# Patient Record
Sex: MALE | Race: BLACK OR AFRICAN AMERICAN | NOT HISPANIC OR LATINO | Employment: UNEMPLOYED | URBAN - METROPOLITAN AREA
[De-identification: names, ages, dates, MRNs, and addresses within clinical notes are randomized per-mention and may not be internally consistent; named-entity substitution may affect disease eponyms.]

---

## 2020-02-10 NOTE — TELEPHONE ENCOUNTER
Patient called to set up care with [facility]  Patient had cervical surgery in [place] on January 3, 2020  He explains that he had disc replacement of C3, C4, C5  Patient states I he stayed in the hospital for three days and then went to a rehab facility and then transitioned to outpatient PT  He is temporarily staying with his children in [place] and his doctors in [place] instructed him to find an orthopedic surgeon to follow him here, as to continue PT  Patient's current insurance [facility] Medicaid through the state of [place]  I advised the patient that we would not be able to see him with out of state Medicaid  I further advised that he should contact member services for guidance

## 2020-06-04 ENCOUNTER — EVALUATION (OUTPATIENT)
Dept: PHYSICAL THERAPY | Facility: REHABILITATION | Age: 65
End: 2020-06-04
Payer: COMMERCIAL

## 2020-06-04 DIAGNOSIS — M54.41 CHRONIC BILATERAL LOW BACK PAIN WITH BILATERAL SCIATICA: ICD-10-CM

## 2020-06-04 DIAGNOSIS — G89.29 CHRONIC BILATERAL LOW BACK PAIN WITH BILATERAL SCIATICA: ICD-10-CM

## 2020-06-04 DIAGNOSIS — M51.36 DDD (DEGENERATIVE DISC DISEASE), LUMBAR: Primary | ICD-10-CM

## 2020-06-04 DIAGNOSIS — M54.42 CHRONIC BILATERAL LOW BACK PAIN WITH BILATERAL SCIATICA: ICD-10-CM

## 2020-06-04 PROCEDURE — 97110 THERAPEUTIC EXERCISES: CPT | Performed by: PHYSICAL THERAPIST

## 2020-06-04 PROCEDURE — 97163 PT EVAL HIGH COMPLEX 45 MIN: CPT | Performed by: PHYSICAL THERAPIST

## 2020-06-08 ENCOUNTER — TRANSCRIBE ORDERS (OUTPATIENT)
Dept: PHYSICAL THERAPY | Facility: REHABILITATION | Age: 65
End: 2020-06-08

## 2020-06-08 DIAGNOSIS — M51.36 DDD (DEGENERATIVE DISC DISEASE), LUMBAR: Primary | ICD-10-CM

## 2020-06-17 ENCOUNTER — OFFICE VISIT (OUTPATIENT)
Dept: PHYSICAL THERAPY | Facility: REHABILITATION | Age: 65
End: 2020-06-17
Payer: COMMERCIAL

## 2020-06-17 DIAGNOSIS — M54.41 CHRONIC BILATERAL LOW BACK PAIN WITH BILATERAL SCIATICA: ICD-10-CM

## 2020-06-17 DIAGNOSIS — G89.29 CHRONIC BILATERAL LOW BACK PAIN WITH BILATERAL SCIATICA: ICD-10-CM

## 2020-06-17 DIAGNOSIS — M54.42 CHRONIC BILATERAL LOW BACK PAIN WITH BILATERAL SCIATICA: ICD-10-CM

## 2020-06-17 DIAGNOSIS — M51.36 DDD (DEGENERATIVE DISC DISEASE), LUMBAR: Primary | ICD-10-CM

## 2020-06-17 PROCEDURE — 97112 NEUROMUSCULAR REEDUCATION: CPT

## 2020-06-17 PROCEDURE — 97530 THERAPEUTIC ACTIVITIES: CPT

## 2020-06-17 PROCEDURE — 97110 THERAPEUTIC EXERCISES: CPT

## 2020-06-19 ENCOUNTER — OFFICE VISIT (OUTPATIENT)
Dept: PHYSICAL THERAPY | Facility: REHABILITATION | Age: 65
End: 2020-06-19
Payer: COMMERCIAL

## 2020-06-19 DIAGNOSIS — M54.41 CHRONIC BILATERAL LOW BACK PAIN WITH BILATERAL SCIATICA: ICD-10-CM

## 2020-06-19 DIAGNOSIS — G89.29 CHRONIC BILATERAL LOW BACK PAIN WITH BILATERAL SCIATICA: ICD-10-CM

## 2020-06-19 DIAGNOSIS — M54.42 CHRONIC BILATERAL LOW BACK PAIN WITH BILATERAL SCIATICA: ICD-10-CM

## 2020-06-19 DIAGNOSIS — M51.36 DDD (DEGENERATIVE DISC DISEASE), LUMBAR: Primary | ICD-10-CM

## 2020-06-19 PROCEDURE — 97530 THERAPEUTIC ACTIVITIES: CPT | Performed by: PHYSICAL THERAPIST

## 2020-06-19 PROCEDURE — 97112 NEUROMUSCULAR REEDUCATION: CPT | Performed by: PHYSICAL THERAPIST

## 2020-06-19 PROCEDURE — 97110 THERAPEUTIC EXERCISES: CPT | Performed by: PHYSICAL THERAPIST

## 2020-06-23 ENCOUNTER — OFFICE VISIT (OUTPATIENT)
Dept: PHYSICAL THERAPY | Facility: REHABILITATION | Age: 65
End: 2020-06-23
Payer: COMMERCIAL

## 2020-06-23 DIAGNOSIS — M54.42 CHRONIC BILATERAL LOW BACK PAIN WITH BILATERAL SCIATICA: ICD-10-CM

## 2020-06-23 DIAGNOSIS — M54.41 CHRONIC BILATERAL LOW BACK PAIN WITH BILATERAL SCIATICA: ICD-10-CM

## 2020-06-23 DIAGNOSIS — G89.29 CHRONIC BILATERAL LOW BACK PAIN WITH BILATERAL SCIATICA: ICD-10-CM

## 2020-06-23 DIAGNOSIS — M51.36 DDD (DEGENERATIVE DISC DISEASE), LUMBAR: Primary | ICD-10-CM

## 2020-06-23 PROCEDURE — 97530 THERAPEUTIC ACTIVITIES: CPT

## 2020-06-23 PROCEDURE — 97110 THERAPEUTIC EXERCISES: CPT

## 2020-06-23 PROCEDURE — 97112 NEUROMUSCULAR REEDUCATION: CPT

## 2020-06-26 ENCOUNTER — OFFICE VISIT (OUTPATIENT)
Dept: PHYSICAL THERAPY | Facility: REHABILITATION | Age: 65
End: 2020-06-26
Payer: COMMERCIAL

## 2020-06-26 DIAGNOSIS — M54.42 CHRONIC BILATERAL LOW BACK PAIN WITH BILATERAL SCIATICA: ICD-10-CM

## 2020-06-26 DIAGNOSIS — M54.41 CHRONIC BILATERAL LOW BACK PAIN WITH BILATERAL SCIATICA: ICD-10-CM

## 2020-06-26 DIAGNOSIS — G89.29 CHRONIC BILATERAL LOW BACK PAIN WITH BILATERAL SCIATICA: ICD-10-CM

## 2020-06-26 DIAGNOSIS — M51.36 DDD (DEGENERATIVE DISC DISEASE), LUMBAR: Primary | ICD-10-CM

## 2020-06-26 PROCEDURE — 97112 NEUROMUSCULAR REEDUCATION: CPT

## 2020-06-26 PROCEDURE — 97110 THERAPEUTIC EXERCISES: CPT

## 2020-06-26 PROCEDURE — 97530 THERAPEUTIC ACTIVITIES: CPT

## 2020-06-30 ENCOUNTER — OFFICE VISIT (OUTPATIENT)
Dept: PHYSICAL THERAPY | Facility: REHABILITATION | Age: 65
End: 2020-06-30
Payer: COMMERCIAL

## 2020-06-30 DIAGNOSIS — M54.42 CHRONIC BILATERAL LOW BACK PAIN WITH BILATERAL SCIATICA: Primary | ICD-10-CM

## 2020-06-30 DIAGNOSIS — M51.36 DDD (DEGENERATIVE DISC DISEASE), LUMBAR: ICD-10-CM

## 2020-06-30 DIAGNOSIS — M54.41 CHRONIC BILATERAL LOW BACK PAIN WITH BILATERAL SCIATICA: Primary | ICD-10-CM

## 2020-06-30 DIAGNOSIS — G89.29 CHRONIC BILATERAL LOW BACK PAIN WITH BILATERAL SCIATICA: Primary | ICD-10-CM

## 2020-06-30 PROCEDURE — 97112 NEUROMUSCULAR REEDUCATION: CPT | Performed by: PHYSICAL THERAPIST

## 2020-06-30 PROCEDURE — 97110 THERAPEUTIC EXERCISES: CPT | Performed by: PHYSICAL THERAPIST

## 2020-06-30 PROCEDURE — 97530 THERAPEUTIC ACTIVITIES: CPT | Performed by: PHYSICAL THERAPIST

## 2020-07-02 ENCOUNTER — TRANSCRIBE ORDERS (OUTPATIENT)
Dept: ADMINISTRATIVE | Facility: HOSPITAL | Age: 65
End: 2020-07-02

## 2020-07-02 ENCOUNTER — OFFICE VISIT (OUTPATIENT)
Dept: PHYSICAL THERAPY | Facility: REHABILITATION | Age: 65
End: 2020-07-02
Payer: COMMERCIAL

## 2020-07-02 DIAGNOSIS — M54.42 CHRONIC BILATERAL LOW BACK PAIN WITH BILATERAL SCIATICA: Primary | ICD-10-CM

## 2020-07-02 DIAGNOSIS — G89.29 CHRONIC BILATERAL LOW BACK PAIN WITH BILATERAL SCIATICA: Primary | ICD-10-CM

## 2020-07-02 DIAGNOSIS — M54.41 CHRONIC BILATERAL LOW BACK PAIN WITH BILATERAL SCIATICA: Primary | ICD-10-CM

## 2020-07-02 DIAGNOSIS — M54.2 CERVICAL PAIN: Primary | ICD-10-CM

## 2020-07-02 DIAGNOSIS — D72.829 LEUKOCYTOSIS, UNSPECIFIED TYPE: Primary | ICD-10-CM

## 2020-07-02 DIAGNOSIS — M51.36 DDD (DEGENERATIVE DISC DISEASE), LUMBAR: ICD-10-CM

## 2020-07-02 PROCEDURE — 97112 NEUROMUSCULAR REEDUCATION: CPT

## 2020-07-02 PROCEDURE — 97530 THERAPEUTIC ACTIVITIES: CPT

## 2020-07-02 PROCEDURE — 97110 THERAPEUTIC EXERCISES: CPT

## 2020-07-02 NOTE — PROGRESS NOTES
Daily Note     Today's date: 2020  Patient name: Genna Chung  : 1955  MRN: [id number]  Referring provider: Regina Joy MD  Dx:   Encounter Diagnosis     ICD-10-CM    1  Chronic bilateral low back pain with bilateral sciatica M54 42     M54 41     G89 29    2  DDD (degenerative disc disease), lumbar M51 36        Start Time: 920  Stop Time: 1025  Total time in clinic (min): 65 minutes    Subjective: Patient reports increased back discomfort at start of session, he reports he has stopped taking medication for his back pain  Objective: See treatment diary below      Assessment: Tolerated treatment well  Patient demonstrated fatigue post treatment, exhibited good technique with therapeutic exercises and would benefit from continued PT Certain TE not performed secondary to patients discomfort level  Trialed sit to stands from chair, significant genu valgus noted, with some improvements noted with TB above knee  Cues required for slow controlled motion, especially upon sitting  Plan: Continue per plan of care  Progress treatment as tolerated         Precautions: fall risk, prior CS fusion C3-C5, will be seeing neurologist      Daily Treatment Diary      Manual 20     nv                     Ther-Ex                bike nv 5'  5' lvl 1   5' lvl 3   5'    5' lvl 5  5' lvl 5     Ball Rolls lumbar spine flx* 10x5" 10x5''     :05x20    Clam shells* bw 3x10 bw 3x12 3x12 bw 3x15 bw 3x15 bw 3x10 PTB 3x10 PTB    SLR abd 2 weeks           LTR       :05x10 ea    SKTC       :[id number]    Neuro-Re-ed           ADIM* 10x5" 10x5'' np             ADIM marches* 2x8 2x10 np   2x10    3x10   3x10  3x10     ADIM heel taps        3x6   3x6  nv     Bridges w PPT* 3x10 3x12 3x15   3x15  20# 3x10 np       Bridge w TB cue      3x12 20# PTB 3x10 PTB    Tandem Balance nv :30x3 ea 2x30" 2x30''   np   dc       FTEO foam nv :30x3 3x30" 3x30'' np dc     FTEC nv :30x3  np   dc Step and reach at mirror  5 rounds 5 rnds 5 rounds high/low nv 5 rnds high/low     FTEC FOAM   3x30" 3x30'' 3x30'' 2x30"     Low Rows w ppt nv               FT foam head turns and nods       3x30'' ea   3x30" ea                  Ther-Act           Leg Press S3 P 8 nv  3x12 95# 3x15 125#  3x15 145# 3x20 165# 3x20 145#    SL leg Press       nv     Goblet squats or STS      nv 2x10 STS OTB  chair    Fwd Step ups nv 4'' 3x10  np 4'' 3x10 4'' 3x10 6" 3x10     Lat step ups nv   4'' 3x10  4'' 3x10 6" 3x10     Forward/lateral over hurdles     3 laps ea   3 laps ea   3 laps ea  3 laps ea                                                                                                                  Modalities                                         *=on hep

## 2020-07-03 ENCOUNTER — APPOINTMENT (OUTPATIENT)
Dept: PHYSICAL THERAPY | Facility: REHABILITATION | Age: 65
End: 2020-07-03
Payer: COMMERCIAL

## 2020-07-07 ENCOUNTER — HOSPITAL ENCOUNTER (OUTPATIENT)
Dept: CT IMAGING | Facility: HOSPITAL | Age: 65
Discharge: HOME | End: 2020-07-07
Payer: COMMERCIAL

## 2020-07-07 ENCOUNTER — OFFICE VISIT (OUTPATIENT)
Dept: PHYSICAL THERAPY | Facility: REHABILITATION | Age: 65
End: 2020-07-07
Payer: COMMERCIAL

## 2020-07-07 DIAGNOSIS — M54.2 CERVICAL PAIN: ICD-10-CM

## 2020-07-07 DIAGNOSIS — G89.29 CHRONIC BILATERAL LOW BACK PAIN WITH BILATERAL SCIATICA: Primary | ICD-10-CM

## 2020-07-07 DIAGNOSIS — M54.42 CHRONIC BILATERAL LOW BACK PAIN WITH BILATERAL SCIATICA: Primary | ICD-10-CM

## 2020-07-07 DIAGNOSIS — M51.36 DDD (DEGENERATIVE DISC DISEASE), LUMBAR: ICD-10-CM

## 2020-07-07 DIAGNOSIS — M54.41 CHRONIC BILATERAL LOW BACK PAIN WITH BILATERAL SCIATICA: Primary | ICD-10-CM

## 2020-07-07 PROCEDURE — 97112 NEUROMUSCULAR REEDUCATION: CPT | Performed by: PHYSICAL THERAPIST

## 2020-07-07 PROCEDURE — 72125 CT NECK SPINE W/O DYE: CPT

## 2020-07-07 PROCEDURE — 97140 MANUAL THERAPY 1/> REGIONS: CPT | Performed by: PHYSICAL THERAPIST

## 2020-07-07 PROCEDURE — 97110 THERAPEUTIC EXERCISES: CPT | Performed by: PHYSICAL THERAPIST

## 2020-07-07 PROCEDURE — 97530 THERAPEUTIC ACTIVITIES: CPT | Performed by: PHYSICAL THERAPIST

## 2020-07-07 NOTE — PROGRESS NOTES
Daily Note     Today's date: 2020  Patient name: Inna Motta  : 1955  MRN: [id number]  Referring provider: Darcie Styles MD  Dx:   Encounter Diagnosis     ICD-10-CM    1  Chronic bilateral low back pain with bilateral sciatica M54 42     M54 41     G89 29    2  DDD (degenerative disc disease), lumbar M51 36        Start Time: 1050  Stop Time: 1200  Total time in clinic (min): 70 minutes    Subjective: Velia Smith reports that his low back is sore today and he is having an increased challenge with ambulation  Notes his back hurts only when standing  Objective: See treatment diary below      Assessment: Tolerated treatment well  Patient demonstrated fatigue post treatment, exhibited good technique with therapeutic exercises and would benefit from continued PT   low back pain reduced after table exercises this session  Significant fatigue with SL leg press noted bilaterally, especially forcing patient to focus upon bottom portion of the press vs only going 75% into the depth of his hip flexion  Will be seeing neurologist 2020  Plan: Continue per plan of care           Precautions: fall risk, prior CS fusion C3-C5, will be seeing neurologist      Daily Treatment Diary      Manual 20   Hamstring/piriformis range of motion   nv       8' total   left long axis distraction        done   Ther-Ex                bike nv 5'  5' lvl 1   5' lvl 3   5'    5' lvl 5   5' lvl 5 5' lvl 5   Ball Rolls lumbar spine flx* 10x5" 10x5''     :05x20    Clam shells* bw 3x10 bw 3x12 3x12 bw 3x15 bw 3x15 bw 3x10 PTB 3x10 PTB 3x10 PTB   SLR abd 2 weeks           LTR       :05x10 ea 10x5"   SKTC       :[id number] 10x10"   Neuro-Re-ed           ADIM* 10x5" 10x5'' np             ADIM marches* 2x8 2x10 np   2x10    3x10   3x10   3x10  3x10   ADIM heel taps        3x6   3x6   nv  np   Bridges w PPT* 3x10 3x12 3x15   3x15  20# 3x10 np       Bridge w TB cue 3x12 20# PTB 3x10 PTB 3x12 20#   Seated sciatic N glides        10x5"   Tandem Balance nv :30x3 ea 2x30" 2x30''   np   dc       FTEO foam nv :30x3 3x30" 3x30'' np dc     FTEC nv :30x3  np   dc     Step and reach at mirror  5 rounds 5 rnds 5 rounds high/low nv 5 rnds high/low  nv   FTEC FOAM   3x30" 3x30'' 3x30'' 2x30"     Low Rows w ppt nv               FT foam head turns and nods       3x30'' ea   3x30" ea       Seated sciatic N glides        10x5" ea              Ther-Act           Leg Press S3 P 8 nv  3x12 95# 3x15 125#  3x15 145# 3x20 165# 3x20 145#    SL leg Press       nv  left 105# 3x12 right 125# 3x10              Goblet squats or STS      nv 2x10 STS OTB  chair 2x10 STS OTB chair   Fwd Step ups nv 4'' 3x10  np 4'' 3x10 4'' 3x10 6" 3x10     Lat step ups nv   4'' 3x10  4'' 3x10 6" 3x10     Forward/lateral over hurdles     3 laps ea   3 laps ea   3 laps ea   3 laps ea  3 laps ea                                                                                                                Modalities                                         *=on hep

## 2020-07-14 ENCOUNTER — OFFICE VISIT (OUTPATIENT)
Dept: PHYSICAL THERAPY | Facility: REHABILITATION | Age: 65
End: 2020-07-14
Payer: COMMERCIAL

## 2020-07-14 DIAGNOSIS — M54.41 CHRONIC BILATERAL LOW BACK PAIN WITH BILATERAL SCIATICA: Primary | ICD-10-CM

## 2020-07-14 DIAGNOSIS — M54.42 CHRONIC BILATERAL LOW BACK PAIN WITH BILATERAL SCIATICA: Primary | ICD-10-CM

## 2020-07-14 DIAGNOSIS — M51.36 DDD (DEGENERATIVE DISC DISEASE), LUMBAR: ICD-10-CM

## 2020-07-14 DIAGNOSIS — G89.29 CHRONIC BILATERAL LOW BACK PAIN WITH BILATERAL SCIATICA: Primary | ICD-10-CM

## 2020-07-14 PROCEDURE — 97530 THERAPEUTIC ACTIVITIES: CPT

## 2020-07-14 PROCEDURE — 97110 THERAPEUTIC EXERCISES: CPT

## 2020-07-14 PROCEDURE — 97112 NEUROMUSCULAR REEDUCATION: CPT

## 2020-07-14 NOTE — PROGRESS NOTES
Daily Note     Today's date: 2020  Patient name: Leandro Fields  : 1955  MRN: [id number]  Referring provider: Maria Elena Hester MD  Dx:   Encounter Diagnosis     ICD-10-CM    1  Chronic bilateral low back pain with bilateral sciatica M54 42     M54 41     G89 29    2  DDD (degenerative disc disease), lumbar M51 36        Start Time: 1117  Stop Time: 1220  Total time in clinic (min): 63 minutes     Subjective: Justin Kuo reports that his back is "okay" at start of session  He reports "good days and bad days it is sporadic " Patient reports "my legs are weak, it's hard to walk, I notice it all the time "       Objective: See treatment diary below      Assessment: Tolerated treatment well  Patient demonstrated fatigue post treatment, exhibited good technique with therapeutic exercises and would benefit from continued PT Posterior LOB noted 1x with sit to stands  Cues required for slow controlled motion on single leg press for increased quad contraction, little carry over  Improvements noted with coordination and control during evette walking  Plan: Continue per plan of care  Progress treatment as tolerated            Precautions: fall risk, prior CS fusion C3-C5, will be seeing neurologist      Daily Treatment Diary      Manual    Hamstring/piriformis range of motion          8' total   left long axis distraction        done   Ther-Ex                bike 5' lvl 5     5' lvl 3   5'    5' lvl 5   5' lvl 5 5' lvl 5   Ball Rolls lumbar spine flx*       :05x20    Clam shells* 3x10 OTB   3x15 bw 3x15 bw 3x10 PTB 3x10 PTB 3x10 PTB   SLR abd 2 weeks           LTR 10x5''      :05x10 ea 10x5"   SKTC 10x10''      :32y01rs 10x10"   Neuro-Re-ed           ADIM*                ADIM marches*      2x10    3x10   3x10   3x10   3x10   ADIM heel taps        3x6   3x6   nv   np   Bridges w PPT*      3x15  20# 3x10 np       Bridge w TB cue 3x15 20# OTB     3x12 20# PTB 3x10 PTB 3x12 20# Seated sciatic N glides        10x5"   Tandem Balance    2x30''   np   dc       FTEO foam    3x30'' np dc     FTEC      dc     Step and reach at mirror    5 rounds high/low nv 5 rnds high/low  nv   FTEC FOAM    3x30'' 3x30'' 2x30"     Low Rows w ppt nv               FT foam head turns and nods       3x30'' ea   3x30" ea       Seated sciatic N glides 10x5'' ea       10x5" ea              Ther-Act           Leg Press S3 P 8 125# 3x15    3x15 125#  3x15 145# 3x20 165# 3x20 145#    SL leg Press  Left 105# 3x15   Right 125# 3x15     nv  left 105# 3x12 right 125# 3x10              Goblet squats or STS 3x10 STS OTB chair      nv 2x10 STS OTB  chair 2x10 STS OTB chair   Fwd Step ups    4'' 3x10 4'' 3x10 6" 3x10     Lat step ups    4'' 3x10  4'' 3x10 6" 3x10     Forward/lateral over hurdles 5 laps ea    3 laps ea   3 laps ea   3 laps ea   3 laps ea   3 laps ea                                                                                                                Modalities                                         *=on hep

## 2020-07-28 ENCOUNTER — OFFICE VISIT (OUTPATIENT)
Dept: PHYSICAL THERAPY | Facility: REHABILITATION | Age: 65
End: 2020-07-28
Payer: COMMERCIAL

## 2020-07-28 DIAGNOSIS — G89.29 CHRONIC BILATERAL LOW BACK PAIN WITH BILATERAL SCIATICA: Primary | ICD-10-CM

## 2020-07-28 DIAGNOSIS — M54.42 CHRONIC BILATERAL LOW BACK PAIN WITH BILATERAL SCIATICA: Primary | ICD-10-CM

## 2020-07-28 DIAGNOSIS — M51.36 DDD (DEGENERATIVE DISC DISEASE), LUMBAR: ICD-10-CM

## 2020-07-28 DIAGNOSIS — M54.41 CHRONIC BILATERAL LOW BACK PAIN WITH BILATERAL SCIATICA: Primary | ICD-10-CM

## 2020-07-28 PROCEDURE — 97530 THERAPEUTIC ACTIVITIES: CPT

## 2020-07-28 PROCEDURE — 97110 THERAPEUTIC EXERCISES: CPT

## 2020-07-28 PROCEDURE — 97112 NEUROMUSCULAR REEDUCATION: CPT

## 2020-07-28 NOTE — PROGRESS NOTES
Daily Note     Today's date: 2020  Patient name: David Raymond  : 1955  MRN: [id number]  Referring provider: Xander Perkins MD  Dx:   Encounter Diagnosis     ICD-10-CM    1  Chronic bilateral low back pain with bilateral sciatica M54 42     M54 41     G89 29    2  DDD (degenerative disc disease), lumbar M51 36        Start Time: 1400  Stop Time: 1455  Total time in clinic (min): 55 minutes    Subjective: Patient reporting "legs are very heavy, balance is not good, harder for me to walk " He reports increased back/LE "pressure" stating "not really pain just a lot of pressure built up "  Patient reports missing his original [place] appointment, however states he saw another MD and is to have an EMG completed, appointment is not yet made  Objective: See treatment diary below      Assessment: Tolerated treatment fair  Patient demonstrated fatigue post treatment  Significant LOB noted during entire session today, assistance from therapist ranging from CG to mod assist to help patient regain balance multiple times  Patient had reports of low back and bilateral LE tightness and "pressure" during session  Plan: Patient to be placed on hold until follow up with MD and further testing  Will continue to follow up with patient as appropriate        Precautions: fall risk, prior CS fusion C3-C5, will be seeing neurologist      Daily Treatment Diary      Manual    Hamstring/piriformis range of motion          8' total   left long axis distraction        done   Ther-Ex                bike 5' lvl 5 5' lvl 5    5' lvl 3   5'    5' lvl 5   5' lvl 5 5' lvl 5   Ball Rolls lumbar spine flx*       :05x20    Clam shells* 3x10 OTB 3x10 OTB  3x15 bw 3x15 bw 3x10 PTB 3x10 PTB 3x10 PTB   SLR abd 2 weeks           LTR 10x5'' 10x5''     :05x10 ea 10x5"   SKTC 10x10'' 10x10''     :85m65ib 10x10"   Neuro-Re-ed           ADIM*                ADIM marches*      2x10    3x10   3x10 3x10   3x10   ADIM heel taps        3x6   3x6   nv   np   Bridges w PPT*      3x15  20# 3x10 np       Bridge w TB cue 3x15 20# OTB 3x15 20# OTB    3x12 20# PTB 3x10 PTB 3x12 20#   Seated sciatic N glides        10x5"   Tandem Balance    2x30''   np   dc       FTEO foam    3x30'' np dc     FTEC      dc     Step and reach at mirror    5 rounds high/low nv 5 rnds high/low  nv   FTEC FOAM    3x30'' 3x30'' 2x30"     Low Rows w ppt nv               FT foam head turns and nods       3x30'' ea   3x30" ea       Seated sciatic N glides 10x5'' ea       10x5" ea              Ther-Act           Leg Press S3 P 8 125# 3x15  135# 3x15  3x15 125#  3x15 145# 3x20 165# 3x20 145#    SL leg Press  Left 105# 3x15   Right 125# 3x15 np    nv  left 105# 3x12 right 125# 3x10              Goblet squats or STS 3x10 STS OTB chair  3x10 STS OTB chair    nv 2x10 STS OTB  chair 2x10 STS OTB chair   Fwd Step ups    4'' 3x10 4'' 3x10 6" 3x10     Lat step ups    4'' 3x10  4'' 3x10 6" 3x10     Forward/lateral over hurdles 5 laps ea 5 laps ea   3 laps ea   3 laps ea   3 laps ea   3 laps ea   3 laps ea                                                                                                                Modalities                                         *=on hep

## 2020-08-19 ENCOUNTER — TRANSCRIBE ORDERS (OUTPATIENT)
Dept: ADMINISTRATIVE | Facility: HOSPITAL | Age: 65
End: 2020-08-19

## 2020-08-19 DIAGNOSIS — R10.11 RUQ PAIN: Primary | ICD-10-CM

## 2020-08-22 ENCOUNTER — HOSPITAL ENCOUNTER (OUTPATIENT)
Dept: ULTRASOUND IMAGING | Facility: HOSPITAL | Age: 65
Discharge: HOME | End: 2020-08-22
Payer: COMMERCIAL

## 2020-08-22 DIAGNOSIS — R10.11 RUQ PAIN: ICD-10-CM

## 2020-08-22 PROCEDURE — 76700 US EXAM ABDOM COMPLETE: CPT

## 2020-08-26 ENCOUNTER — HOSPITAL ENCOUNTER (EMERGENCY)
Facility: HOSPITAL | Age: 65
Discharge: HOME | End: 2020-08-26
Attending: EMERGENCY MEDICINE | Admitting: EMERGENCY MEDICINE
Payer: COMMERCIAL

## 2020-08-26 ENCOUNTER — TELEPHONE (OUTPATIENT)
Dept: UROLOGY | Facility: AMBULATORY SURGERY CENTER | Age: 65
End: 2020-08-26

## 2020-08-26 VITALS
BODY MASS INDEX: 23.7 KG/M2 | RESPIRATION RATE: 18 BRPM | HEART RATE: 104 BPM | TEMPERATURE: 96.7 F | HEIGHT: 69 IN | DIASTOLIC BLOOD PRESSURE: 77 MMHG | SYSTOLIC BLOOD PRESSURE: 178 MMHG | WEIGHT: 160 LBS | OXYGEN SATURATION: 98 %

## 2020-08-26 DIAGNOSIS — N39.0 UTI (URINARY TRACT INFECTION): ICD-10-CM

## 2020-08-26 DIAGNOSIS — R33.9 URINARY RETENTION: Primary | ICD-10-CM

## 2020-08-26 DIAGNOSIS — N40.0 BPH (BENIGN PROSTATIC HYPERPLASIA): ICD-10-CM

## 2020-08-26 LAB
BACTERIA UR QL AUTO: ABNORMAL /HPF
BILIRUB UR QL STRIP: NEGATIVE
CLARITY UR: CLEAR
COLOR UR: YELLOW
GLUCOSE UR STRIP-MCNC: NEGATIVE MG/DL
HGB UR QL STRIP.AUTO: ABNORMAL
KETONES UR STRIP-MCNC: NEGATIVE MG/DL
LEUKOCYTE ESTERASE UR QL STRIP: NEGATIVE
NITRITE UR QL STRIP: NEGATIVE
NON-SQ EPI CELLS URNS QL MICRO: ABNORMAL /HPF
PH UR STRIP.AUTO: 7 [PH]
PROT UR STRIP-MCNC: NEGATIVE MG/DL
RBC #/AREA URNS AUTO: ABNORMAL /HPF
SP GR UR STRIP.AUTO: 1.01 (ref 1–1.03)
UROBILINOGEN UR QL STRIP.AUTO: 0.2 E.U./DL
WBC #/AREA URNS AUTO: ABNORMAL /HPF

## 2020-08-26 PROCEDURE — 99284 EMERGENCY DEPT VISIT MOD MDM: CPT | Performed by: EMERGENCY MEDICINE

## 2020-08-26 PROCEDURE — 81001 URINALYSIS AUTO W/SCOPE: CPT | Performed by: EMERGENCY MEDICINE

## 2020-08-26 PROCEDURE — 81003 URINALYSIS AUTO W/O SCOPE: CPT | Performed by: EMERGENCY MEDICINE

## 2020-08-26 PROCEDURE — 99283 EMERGENCY DEPT VISIT LOW MDM: CPT

## 2020-08-26 RX ORDER — LIDOCAINE HYDROCHLORIDE 20 MG/ML
1 JELLY TOPICAL ONCE
Status: COMPLETED | OUTPATIENT
Start: 2020-08-26 | End: 2020-08-26

## 2020-08-26 RX ORDER — LEVOFLOXACIN 500 MG/1
500 TABLET, FILM COATED ORAL ONCE
Status: COMPLETED | OUTPATIENT
Start: 2020-08-26 | End: 2020-08-26

## 2020-08-26 RX ORDER — LEVOFLOXACIN 500 MG/1
500 TABLET, FILM COATED ORAL DAILY
Qty: 7 TABLET | Refills: 0 | Status: SHIPPED | OUTPATIENT
Start: 2020-08-26 | End: 2020-09-02

## 2020-08-26 RX ORDER — ATROPA BELLADONNA AND OPIUM 16.2; 3 MG/1; MG/1
30 SUPPOSITORY RECTAL EVERY 8 HOURS PRN
Status: DISCONTINUED | OUTPATIENT
Start: 2020-08-26 | End: 2020-08-26 | Stop reason: HOSPADM

## 2020-08-26 RX ORDER — LEVOFLOXACIN 5 MG/ML
750 INJECTION, SOLUTION INTRAVENOUS ONCE
Status: DISCONTINUED | OUTPATIENT
Start: 2020-08-26 | End: 2020-08-26

## 2020-08-26 RX ADMIN — LIDOCAINE HYDROCHLORIDE 1 APPLICATION: 20 JELLY TOPICAL at 06:23

## 2020-08-26 RX ADMIN — LEVOFLOXACIN 500 MG: 500 TABLET, FILM COATED ORAL at 06:24

## 2020-08-26 RX ADMIN — ATROPA BELLADONNA AND OPIUM 1 SUPPOSITORY: 16.2; 3 SUPPOSITORY RECTAL at 06:23

## 2020-08-26 NOTE — DISCHARGE INSTRUCTIONS
I have referred you to a urologist - [facility]     Stop taking the antibiotic that you were prescribed (Bactrim) and begin the new antibiotic tomorrow - you  have received the initial dose here

## 2020-08-26 NOTE — TELEPHONE ENCOUNTER
Reason for appointment/Complaint/Diagnosis : patient seen in emergency room for urinary retention  Patient is having pain in urethra has catheter mtz   Requesting to be seen as soon as possible,  He is in severe pain  Insurance:txtr  History of Cancer? KE                      If yes, what kind?no  Previous urologist?  yes          Records requested/where? Epic  Outside testing/where? no    Location Preference for office visit?  [facility]

## 2020-08-26 NOTE — ED PROVIDER NOTES
History  Chief Complaint   Patient presents with    Difficulty Urinating     Patient reports difficulty urinating and burning with urination for the past week     This is a 60year old male that ambulated to the ED this morning with c/o urinary retention - he reports that he has a hx of an enlarged prostate  Reports that he has had urinary retention for the past week - placed on Bactrim with no improvement    Has dysuria and voiding very small amounts    500+ ml or urine present on bladder scan    Denies fever or chills    Currently is followed by urology at [place] [id number]  however states that he would like to be referred to a new urologist          None       History reviewed  No pertinent past medical history  History reviewed  No pertinent surgical history  History reviewed  No pertinent family history  I have reviewed and agree with the history as documented  E-Cigarette/Vaping    E-Cigarette Use Never User      E-Cigarette/Vaping Substances     Social History     Tobacco Use    Smoking status: Never Smoker    Smokeless tobacco: Never Used   Substance Use Topics    Alcohol use: Never     Frequency: Never    Drug use: Never       Review of Systems   Constitutional: Negative for activity change, appetite change, chills, diaphoresis, fatigue, fever and unexpected weight change  HENT: Negative for congestion, ear discharge, ear pain, mouth sores, sinus pressure, sinus pain, sneezing, sore throat, trouble swallowing and voice change  Eyes: Negative for photophobia, pain, discharge, redness, itching and visual disturbance  Respiratory: Negative for cough, chest tightness and shortness of breath  Cardiovascular: Negative for chest pain, palpitations and leg swelling  Gastrointestinal: Negative for abdominal pain, constipation, nausea and vomiting  Endocrine: Negative for cold intolerance, heat intolerance, polydipsia, polyphagia and polyuria     Genitourinary: Positive for difficulty urinating, dysuria and urgency  Negative for decreased urine volume, frequency and hematuria  Known BPH   Musculoskeletal: Positive for gait problem (ambulates with a cane)  Negative for arthralgias, back pain, joint swelling, myalgias, neck pain and neck stiffness  Skin: Negative for color change and rash  Allergic/Immunologic: Negative for immunocompromised state  Neurological: Negative for dizziness, tremors, seizures, syncope, speech difficulty, weakness, light-headedness, numbness and headaches  Hematological: Does not bruise/bleed easily  Psychiatric/Behavioral: Negative for behavioral problems and suicidal ideas  Physical Exam  Physical Exam  Vitals signs and nursing note reviewed  Constitutional:       General: He is not in acute distress  Appearance: Normal appearance  He is well-developed and normal weight  He is not ill-appearing, toxic-appearing or diaphoretic  HENT:      Head: Normocephalic and atraumatic  Right Ear: Tympanic membrane, ear canal and external ear normal  There is no impacted cerumen  Left Ear: Tympanic membrane, ear canal and external ear normal  There is no impacted cerumen  Nose: Nose normal  No congestion or rhinorrhea  Mouth/Throat:      Mouth: Mucous membranes are moist       Pharynx: Oropharynx is clear  No oropharyngeal exudate or posterior oropharyngeal erythema  Eyes:      General: No scleral icterus  Right eye: No discharge  Left eye: No discharge  Extraocular Movements: Extraocular movements intact  Conjunctiva/sclera: Conjunctivae normal       Pupils: Pupils are equal, round, and reactive to light  Neck:      Musculoskeletal: Normal range of motion and neck supple  No neck rigidity or muscular tenderness  Vascular: No JVD  Trachea: No tracheal deviation  Cardiovascular:      Rate and Rhythm: Normal rate and regular rhythm  Heart sounds: Normal heart sounds  No murmur     Pulmonary: Effort: Pulmonary effort is normal  No respiratory distress  Breath sounds: Normal breath sounds  No wheezing or rales  Chest:      Chest wall: No tenderness  Abdominal:      General: Bowel sounds are normal  There is distension (bladder distended)  Palpations: Abdomen is soft  There is no mass  Tenderness: There is no abdominal tenderness  There is no right CVA tenderness, left CVA tenderness or guarding  Hernia: No hernia is present  Musculoskeletal: Normal range of motion  General: No swelling, tenderness, deformity or signs of injury  Right lower leg: No edema  Left lower leg: No edema  Lymphadenopathy:      Cervical: No cervical adenopathy  Skin:     General: Skin is warm and dry  Capillary Refill: Capillary refill takes less than 2 seconds  Findings: No bruising, erythema or rash  Neurological:      General: No focal deficit present  Mental Status: He is alert and oriented to person, place, and time  Mental status is at baseline  Cranial Nerves: No cranial nerve deficit  Sensory: No sensory deficit  Motor: No weakness or abnormal muscle tone  Coordination: Coordination normal       Gait: Gait normal       Deep Tendon Reflexes: Reflexes normal    Psychiatric:         Mood and Affect: Mood normal          Behavior: Behavior normal          Thought Content:  Thought content normal          Judgment: Judgment normal          Vital Signs  ED Triage Vitals [08/26/20 0601]   Temperature Pulse Respirations BP SpO2   (!) 96 7 °F (35 9 °C) 104 18 -- 98 %      Temp Source Heart Rate Source Patient Position - Orthostatic VS BP Location FiO2 (%)   Tympanic Monitor Lying Left arm --      Pain Score       --           Vitals:    08/26/20 0601   Pulse: 104   Patient Position - Orthostatic VS: Lying         Visual Acuity      ED Medications  Medications   belladonna-opium (B&O SUPPOSITORY) 16 2-30 mg suppository 1 suppository (1 suppository Rectal Given 8/26/20 0623)   lidocaine (URO-JET) 2 % urethral/mucosal gel 1 application (1 application Urethral Given 8/26/20 0623)   levofloxacin (LEVAQUIN) tablet 500 mg (500 mg Oral Given 8/26/20 0729)       Diagnostic Studies  Results Reviewed     Procedure Component Value Units Date/Time    UA w Reflex to Microscopic w Reflex to Culture [[id number]] Collected:  08/26/20 4810    Lab Status:  No result Specimen:  Urine, Other                  No orders to display              Procedures  Procedures         ED Course  ED Course as of Aug 26 0642   Wed Aug 26, 2020   59 This 60year old male with hx of BPH presents to the ED with urinary retention and dysuria for one week - has been seen by PCP and placed on Bactrim with no relief - 500+ mls of urine on bladder scan after voiding less than 10 ml  Ordered B & O suppository - urojet lidocaine - Coude mtz inserted - return of 500+ ml of urine - changed to leg beg - instructions for care given orally and in discharge instructions  [facility] to [facility]  Changed antibiotic to Levaquin - to stop Bactrim  1822 On re eval after catheter insertion, pt admitted to relief of suprapubic discomfort          US AUDIT      Most Recent Value   Initial Alcohol Screen: US AUDIT-C    1  How often do you have a drink containing alcohol?  0 Filed at: 08/26/2020 0602   2  How many drinks containing alcohol do you have on a typical day you are drinking? 0 Filed at: 08/26/2020 0602   3a  Male UNDER 65: How often do you have five or more drinks on one occasion? 0 Filed at: 08/26/2020 0602   Audit-C Score  0 Filed at: 08/26/2020 0602                  MILTON/DAST-10      Most Recent Value   How many times in the past year have you    Used an illegal drug or used a prescription medication for non-medical reasons?   Never Filed at: 08/26/2020 0603                                MDM  Number of Diagnoses or Management Options  Diagnosis management comments: BPH,UTI       Amount and/or Complexity of Data Reviewed  Clinical lab tests: ordered and reviewed  Obtain history from someone other than the patient: yes (RN)    Risk of Complications, Morbidity, and/or Mortality  Presenting problems: moderate  Diagnostic procedures: low  Management options: low    Patient Progress  Patient progress: improved        Disposition  Final diagnoses:   Urinary retention   UTI (urinary tract infection)   BPH (benign prostatic hyperplasia)     Time reflects when diagnosis was documented in both MDM as applicable and the Disposition within this note     Time User Action Codes Description Comment    8/26/2020  6:10 AM Rj Abbasi [R33 9] Urinary retention     8/26/2020  6:11 AM Rj Abbasi [N39 0] UTI (urinary tract infection)     8/26/2020  6:11 AM Rj Abbasi [N40 0] BPH (benign prostatic hyperplasia)       ED Disposition     ED Disposition Condition Date/Time Comment    Discharge Stable Wed Aug 26, 2020  6:10 AM Meg Brown discharge to home/self care              Follow-up Information    None         Patient's Medications   Discharge Prescriptions    LEVOFLOXACIN (LEVAQUIN) 500 MG TABLET    Take 1 tablet (500 mg total) by mouth daily for 7 days       Start Date: 8/26/2020 End Date: 9/2/2020       Order Dose: 500 mg       Quantity: 7 tablet    Refills: 0         PDMP Review     None          ED Provider  Electronically Signed by           Kyra Flores MD  08/26/20 0050

## 2020-08-26 NOTE — TELEPHONE ENCOUNTER
Patient called second time  Reported to be having increased pain   He is reporting to want the catheter removed as well    Please call at [phone]  Thank you

## 2020-08-26 NOTE — TELEPHONE ENCOUNTER
Attempted to call patient at this time no answer left message with office number for call back    I tentatively scheduled patient for mtz removal and void trial klaudia 8/27/2020 8/27/2020 at 9am with nurse  and 2:45pm with AP  Please confirm appt time and date with patient upon call back   If patient unable to make appt time and date please route back to clinical, as patient will need to wait till next week

## 2020-08-26 NOTE — TELEPHONE ENCOUNTER
Patient seen in the ED this morning for urinary retention  Patient had a PVR of 500+ ml   Rodriguez catheter placed at this time      Will route to provider for appropriate appt timeframe

## 2020-08-27 ENCOUNTER — OFFICE VISIT (OUTPATIENT)
Dept: UROLOGY | Facility: CLINIC | Age: 65
End: 2020-08-27
Payer: COMMERCIAL

## 2020-08-27 VITALS — BODY MASS INDEX: 23.78 KG/M2 | HEIGHT: 69 IN

## 2020-08-27 DIAGNOSIS — R33.9 URINARY RETENTION: Primary | ICD-10-CM

## 2020-08-27 LAB — POST-VOID RESIDUAL VOLUME, ML POC: 130 ML

## 2020-08-27 PROCEDURE — 51798 US URINE CAPACITY MEASURE: CPT | Performed by: PHYSICIAN ASSISTANT

## 2020-08-27 PROCEDURE — 99203 OFFICE O/P NEW LOW 30 MIN: CPT | Performed by: PHYSICIAN ASSISTANT

## 2020-08-27 PROCEDURE — 51702 INSERT TEMP BLADDER CATH: CPT

## 2020-08-27 RX ORDER — TAMSULOSIN HYDROCHLORIDE 0.4 MG/1
0.4 CAPSULE ORAL
COMMUNITY

## 2020-08-27 RX ORDER — FINASTERIDE 5 MG/1
5 TABLET, FILM COATED ORAL DAILY
COMMUNITY

## 2020-08-27 NOTE — PROGRESS NOTES
1  Urinary retention    - POCT Measure PVR          Assessment and plan:       1  BPH with Urinary retention  - catheter was removed this morning and patient has a PVR of 130 mL this afternoon  - he is unable to produce a good urinary stream and is still very uncomfortable  2  Prostate cancer screening  - patient will need digital rectal exam at the time of transrectal ultrasound, he is uncomfortable from his urination today  - PSA will need to be checked several weeks after instrumentation as we would like to avoid a falsely elevated result    I have recommended that the patient have the catheter replaced  He will plan to continue his tamsulosin and finasteride as previously prescribed  He will return in the near future for cystoscopic evaluation in transrectal ultrasound for further evaluation of bladder outlet obstruction and discussion of possible procedures  Erika Millan PA-C      Chief Complaint     No chief complaint on file  History of Present Illness     Meliton Falcon is a 60 y o  male patient seen in the emergency department on 08/26/2020 for urinary retention with greater than 500 mL of retained urine  Rodriguez catheter was placed at that time and patient is on Flomax  Patient was treated for urinary tract infection by Dr Thea Fleischer earlier this month  He was also previously treated through [facility] for incomplete bladder emptying and urinary tract infection  An ultrasound in 2017 showed bladder wall thickening and an enlarged prostate measuring 116 cc  At the time of treatment for his recent urinary tract infection, patient was also initiated on tamsulosin and finasteride  He reports no relief of his symptoms with these medications  Although his PVR today is only 130, he needed to use the restroom immediately after having this checked and reports that he is having significant difficulty with urinating  Only a few drops are coming out at a time      Patient is a relatively poor historian  Laboratory     No results found for: CREATININE    No results found for: PSA    Recent Results (from the past 1 hour(s))   POCT Measure PVR    Collection Time: 08/27/20  2:29 PM   Result Value Ref Range    POST-VOID RESIDUAL VOLUME, ML  mL         Review of Systems     Review of Systems   Constitutional: Negative for chills and fever  HENT: Negative  Eyes: Negative  Respiratory: Negative for cough and shortness of breath  Cardiovascular: Negative for chest pain  Gastrointestinal: Negative for constipation, diarrhea, nausea and vomiting  Endocrine: Negative  Genitourinary: Positive for difficulty urinating, frequency and urgency  Negative for dysuria, enuresis, flank pain and hematuria  Musculoskeletal: Negative  Skin: Negative  Allergies     Allergies   Allergen Reactions    Penicillins Itching       Physical Exam     Physical Exam  Vitals signs and nursing note reviewed  Constitutional:       General: He is not in acute distress  Appearance: Normal appearance  He is well-developed  He is not ill-appearing, toxic-appearing or diaphoretic  HENT:      Head: Normocephalic and atraumatic  Right Ear: External ear normal       Left Ear: External ear normal    Eyes:      General: No scleral icterus  Right eye: No discharge  Left eye: No discharge  Cardiovascular:      Rate and Rhythm: Normal rate  Pulmonary:      Effort: Pulmonary effort is normal    Musculoskeletal:      Comments: Ambulates with a cane   Skin:     General: Skin is warm and dry  Neurological:      Mental Status: He is alert and oriented to person, place, and time  Psychiatric:         Mood and Affect: Mood normal          Behavior: Behavior normal          Thought Content:  Thought content normal          Judgment: Judgment normal            Vital Signs     Vitals:    08/27/20 1422   Height: 5' 9" (1 753 m)         Current Medications Current Outpatient Medications:     finasteride (PROSCAR) 5 mg tablet, Take 5 mg by mouth daily, Disp: , Rfl:     levofloxacin (LEVAQUIN) 500 mg tablet, Take 1 tablet (500 mg total) by mouth daily for 7 days, Disp: 7 tablet, Rfl: 0    tamsulosin (FLOMAX) 0 4 mg, Take 0 4 mg by mouth daily with dinner, Disp: , Rfl:       Active Problems     Patient Active Problem List   Diagnosis    Urinary retention    UTI (urinary tract infection)    BPH (benign prostatic hyperplasia)         Past Medical History     Past Medical History:   Diagnosis Date    History of lumbar surgery          Surgical History     History reviewed  No pertinent surgical history  Family History     No family history on file        Social History     Social History       Radiology

## 2020-08-27 NOTE — PROGRESS NOTES
8/27/2020  Brianna Guidry is a 60 y o  male  [id number]          Orders Placed This Encounter   Procedures    POCT Measure PVR    Cystoscopy        Vitals:    08/27/20 1422   Height: 5' 9" (1 753 m)           Procedure:    Bladder catheterization    Date/Time: 8/27/2020 3:36 PM  Performed by: Sofia Cross RN  Authorized by: Doris Apodaca PA-C     Patient location:  Bedside  Consent:     Consent obtained:  Verbal    Consent given by:  Patient  Universal protocol:     Patient identity confirmed:  Verbally with patient  Pre-procedure details:     Procedure purpose:  Therapeutic  Anesthesia (see MAR for exact dosages): Anesthesia method:  None  Procedure details:     Bladder irrigation: no      Catheter insertion:  Indwelling    Approach: natural orifice      Catheter type:  Coude, latex and Mtz    Catheter size:  16 Fr    Number of attempts:  1    Successful placement: yes      Urine characteristics:  Yellow  Post-procedure details:     Patient tolerance of procedure: Tolerated well, no immediate complications  Comments:      Site cleaned with betadine  After successful insertion of mtz catheter balloon was inflated with 10 ml of sterile water  Mtz flushed with 20 ml of sterile water with good return  Mtz attached to drainage leg bag  Patient provided extra leg bags               Sofia Cross RN

## 2020-08-28 ENCOUNTER — HOSPITAL ENCOUNTER (EMERGENCY)
Facility: HOSPITAL | Age: 65
Discharge: HOME | End: 2020-08-28
Attending: EMERGENCY MEDICINE | Admitting: EMERGENCY MEDICINE
Payer: COMMERCIAL

## 2020-08-28 VITALS
OXYGEN SATURATION: 99 % | WEIGHT: 161 LBS | TEMPERATURE: 98.2 F | RESPIRATION RATE: 18 BRPM | BODY MASS INDEX: 23.85 KG/M2 | HEIGHT: 69 IN | HEART RATE: 83 BPM | SYSTOLIC BLOOD PRESSURE: 148 MMHG | DIASTOLIC BLOOD PRESSURE: 86 MMHG

## 2020-08-28 DIAGNOSIS — T83.9XXA PROBLEM WITH FOLEY CATHETER, INITIAL ENCOUNTER (HCC): Primary | ICD-10-CM

## 2020-08-28 DIAGNOSIS — N32.89 BLADDER SPASMS: Primary | ICD-10-CM

## 2020-08-28 PROCEDURE — 99283 EMERGENCY DEPT VISIT LOW MDM: CPT

## 2020-08-28 PROCEDURE — 99282 EMERGENCY DEPT VISIT SF MDM: CPT | Performed by: EMERGENCY MEDICINE

## 2020-08-28 RX ORDER — OXYBUTYNIN CHLORIDE 5 MG/1
5 TABLET ORAL 3 TIMES DAILY
Qty: 30 TABLET | Refills: 1 | Status: SHIPPED | OUTPATIENT
Start: 2020-08-28 | End: 2020-08-28 | Stop reason: SDUPTHER

## 2020-08-28 RX ORDER — OXYBUTYNIN CHLORIDE 5 MG/1
5 TABLET ORAL 3 TIMES DAILY
Qty: 30 TABLET | Refills: 1 | Status: SHIPPED | OUTPATIENT
Start: 2020-08-28

## 2020-08-28 NOTE — ED PROVIDER NOTES
History  Chief Complaint   Patient presents with    Urinary Catheter Problem     pt presents to ed for leakage, states he was at [facility] and got a new mtz put it  wasnt done correctly  no other complaints at this itme     This is a 62-year-old male who ambulates emergency department this morning with a Mtz catheter dysfunction    Patient was here 2 nights ago with urinary retention  We put a coude and him  He followed up with Urology yesterday  They took the catheter out however he continued to have retention so they put another catheter in  Patient states that he has been leaking around the catheter since they put it in  Denies fever chills  Prior to Admission Medications   Prescriptions Last Dose Informant Patient Reported? Taking?   finasteride (PROSCAR) 5 mg tablet  Self Yes No   Sig: Take 5 mg by mouth daily   levofloxacin (LEVAQUIN) 500 mg tablet  Self No No   Sig: Take 1 tablet (500 mg total) by mouth daily for 7 days   tamsulosin (FLOMAX) 0 4 mg  Self Yes No   Sig: Take 0 4 mg by mouth daily with dinner      Facility-Administered Medications: None       Past Medical History:   Diagnosis Date    History of lumbar surgery        History reviewed  No pertinent surgical history  History reviewed  No pertinent family history  I have reviewed and agree with the history as documented  E-Cigarette/Vaping    E-Cigarette Use Never User      E-Cigarette/Vaping Substances     Social History     Tobacco Use    Smoking status: Never Smoker    Smokeless tobacco: Never Used   Substance Use Topics    Alcohol use: Never     Frequency: Never    Drug use: Never       Review of Systems   Constitutional: Negative for activity change, appetite change, chills, diaphoresis, fatigue, fever and unexpected weight change  HENT: Negative for congestion, ear discharge, ear pain, mouth sores, sinus pressure, sinus pain, sneezing, sore throat, trouble swallowing and voice change      Eyes: Negative for photophobia, pain, discharge, redness, itching and visual disturbance  Respiratory: Negative for cough, chest tightness and shortness of breath  Cardiovascular: Negative for chest pain, palpitations and leg swelling  Gastrointestinal: Negative for abdominal pain, constipation, nausea and vomiting  Endocrine: Negative for cold intolerance, heat intolerance, polydipsia, polyphagia and polyuria  Genitourinary: Negative for decreased urine volume, difficulty urinating, dysuria, frequency, hematuria and urgency  Rodriguez catheter dysfunction    Has BPH and urinary retention as a result   Musculoskeletal: Negative for arthralgias, back pain, gait problem, joint swelling, myalgias, neck pain and neck stiffness  Skin: Negative for color change and rash  Allergic/Immunologic: Negative for immunocompromised state  Neurological: Negative for dizziness, tremors, seizures, syncope, speech difficulty, weakness, light-headedness, numbness and headaches  Hematological: Does not bruise/bleed easily  Psychiatric/Behavioral: Negative for behavioral problems and suicidal ideas  Physical Exam  Physical Exam  Vitals signs and nursing note reviewed  Constitutional:       General: He is not in acute distress  Appearance: Normal appearance  He is well-developed and normal weight  He is not ill-appearing, toxic-appearing or diaphoretic  HENT:      Head: Normocephalic and atraumatic  Right Ear: Tympanic membrane, ear canal and external ear normal  There is no impacted cerumen  Left Ear: Tympanic membrane, ear canal and external ear normal  There is no impacted cerumen  Nose: No congestion or rhinorrhea  Mouth/Throat:      Mouth: Mucous membranes are moist       Pharynx: Oropharynx is clear  No oropharyngeal exudate or posterior oropharyngeal erythema  Eyes:      General: No scleral icterus  Right eye: No discharge  Left eye: No discharge        Extraocular Movements: Extraocular movements intact  Conjunctiva/sclera: Conjunctivae normal       Pupils: Pupils are equal, round, and reactive to light  Neck:      Musculoskeletal: Normal range of motion and neck supple  No neck rigidity or muscular tenderness  Vascular: No JVD  Trachea: No tracheal deviation  Cardiovascular:      Rate and Rhythm: Normal rate and regular rhythm  Heart sounds: Normal heart sounds  No murmur  Pulmonary:      Effort: Pulmonary effort is normal  No respiratory distress  Breath sounds: Normal breath sounds  No wheezing or rales  Chest:      Chest wall: No tenderness  Abdominal:      General: Bowel sounds are normal       Palpations: Abdomen is soft  There is no mass  Tenderness: There is no abdominal tenderness  There is no right CVA tenderness, left CVA tenderness or guarding  Hernia: No hernia is present  Genitourinary:     Comments: Rodriguez catheter intact however leaking urine around the Rodriguez  Musculoskeletal: Normal range of motion  General: No swelling, tenderness, deformity or signs of injury  Right lower leg: No edema  Left lower leg: No edema  Lymphadenopathy:      Cervical: No cervical adenopathy  Skin:     General: Skin is warm and dry  Capillary Refill: Capillary refill takes less than 2 seconds  Findings: No bruising, erythema or rash  Neurological:      General: No focal deficit present  Mental Status: He is alert and oriented to person, place, and time  Mental status is at baseline  Cranial Nerves: No cranial nerve deficit  Sensory: No sensory deficit  Motor: No weakness or abnormal muscle tone  Coordination: Coordination normal       Gait: Gait abnormal (Ambulates with a cane)  Deep Tendon Reflexes: Reflexes normal    Psychiatric:         Mood and Affect: Mood normal          Behavior: Behavior normal          Thought Content:  Thought content normal          Judgment: Judgment normal          Vital Signs  ED Triage Vitals [08/28/20 0643]   Temperature Pulse Respirations Blood Pressure SpO2   98 2 °F (36 8 °C) 83 18 148/86 99 %      Temp Source Heart Rate Source Patient Position - Orthostatic VS BP Location FiO2 (%)   Tympanic Monitor -- Right arm --      Pain Score       No Pain           Vitals:    08/28/20 0643   BP: 148/86   Pulse: 83         Visual Acuity      ED Medications  Medications - No data to display    Diagnostic Studies  Results Reviewed     None                 No orders to display              Procedures  Procedures         ED Course     Care transferred to Dr Radha Rand AUDIT      Most Recent Value   Initial Alcohol Screen: US AUDIT-C    1  How often do you have a drink containing alcohol?  0 Filed at: 08/28/2020 0705   2  How many drinks containing alcohol do you have on a typical day you are drinking? 0 Filed at: 08/28/2020 0705   3a  Male UNDER 65: How often do you have five or more drinks on one occasion? 0 Filed at: 08/28/2020 0705   3b  FEMALE Any Age, or MALE 65+: How often do you have 4 or more drinks on one occassion? 0 Filed at: 08/28/2020 0705   Audit-C Score  0 Filed at: 08/28/2020 4161                  MILTON/DAST-10      Most Recent Value   How many times in the past year have you    Used an illegal drug or used a prescription medication for non-medical reasons? Never Filed at: 08/28/2020 0705                                MDM      Disposition  Final diagnoses:   Problem with Rodriguez catheter, initial encounter [facility])     Time reflects when diagnosis was documented in both MDM as applicable and the Disposition within this note     Time User Action Codes Description Comment    8/28/2020  7:24 AM Rolan Steve Add [T83  9XXA] Problem with Rodriguez catheter, initial encounter [facility])       ED Disposition     ED Disposition Condition Date/Time Comment    Eloped  Fri Aug 28, 2020  7:25 AM Pt left ER , WITHOUT telling anyone        Follow-up Information    None         Discharge Medication List as of 8/28/2020  7:29 AM      CONTINUE these medications which have NOT CHANGED    Details   finasteride (PROSCAR) 5 mg tablet Take 5 mg by mouth daily, Historical Med      levofloxacin (LEVAQUIN) 500 mg tablet Take 1 tablet (500 mg total) by mouth daily for 7 days, Starting Wed 8/26/2020, Until Wed 9/2/2020, Normal      tamsulosin (FLOMAX) 0 4 mg Take 0 4 mg by mouth daily with dinner, Historical Med           No discharge procedures on file      PDMP Review     None          ED Provider  Electronically Signed by           Letitia Santiago MD  08/29/20 8764

## 2020-08-28 NOTE — ED NOTES
Removed fluid from mtz cath balloon and found only to have 7 cc's, repositioned cath and reinflated the balloon with the 10 cc that the cath calls for, secured the mtz with a stat lock and help pt change into dry brief  Will continue to monitor for leakage, pt c/o no pain while cath is secured and not being manipulated       Mary Joseph RN  08/28/20 7851

## 2020-08-30 ENCOUNTER — HOSPITAL ENCOUNTER (EMERGENCY)
Facility: HOSPITAL | Age: 65
Discharge: HOME | End: 2020-08-30
Attending: EMERGENCY MEDICINE | Admitting: EMERGENCY MEDICINE
Payer: COMMERCIAL

## 2020-08-30 ENCOUNTER — APPOINTMENT (EMERGENCY)
Dept: CT IMAGING | Facility: HOSPITAL | Age: 65
End: 2020-08-30
Payer: COMMERCIAL

## 2020-08-30 VITALS
RESPIRATION RATE: 17 BRPM | DIASTOLIC BLOOD PRESSURE: 75 MMHG | TEMPERATURE: 98.6 F | BODY MASS INDEX: 23.63 KG/M2 | HEART RATE: 84 BPM | OXYGEN SATURATION: 99 % | WEIGHT: 160 LBS | SYSTOLIC BLOOD PRESSURE: 137 MMHG

## 2020-08-30 DIAGNOSIS — N39.0 UTI (URINARY TRACT INFECTION): Primary | ICD-10-CM

## 2020-08-30 DIAGNOSIS — T83.9XXA PROBLEM WITH URINARY CATHETER (HCC): ICD-10-CM

## 2020-08-30 LAB
ALBUMIN SERPL BCP-MCNC: 4.1 G/DL (ref 3.4–4.8)
ALP SERPL-CCNC: 57 U/L (ref 10–129)
ALT SERPL W P-5'-P-CCNC: 17 U/L (ref 5–63)
ANION GAP SERPL CALCULATED.3IONS-SCNC: 8 MMOL/L (ref 4–13)
APTT PPP: 31 SECONDS (ref 23–31)
AST SERPL W P-5'-P-CCNC: 36 U/L (ref 15–41)
BACTERIA UR QL AUTO: ABNORMAL /HPF
BASOPHILS # BLD AUTO: 0.02 THOUSANDS/ΜL (ref 0–0.1)
BASOPHILS NFR BLD AUTO: 0 % (ref 0–1)
BILIRUB SERPL-MCNC: 1.78 MG/DL (ref 0.3–1.2)
BILIRUB UR QL STRIP: NEGATIVE
BUN SERPL-MCNC: 14 MG/DL (ref 6–20)
CALCIUM SERPL-MCNC: 9.4 MG/DL (ref 8.4–10.2)
CHLORIDE SERPL-SCNC: 102 MMOL/L (ref 96–108)
CLARITY UR: CLEAR
CO2 SERPL-SCNC: 27 MMOL/L (ref 22–33)
COLOR UR: YELLOW
CREAT SERPL-MCNC: 1.19 MG/DL (ref 0.5–1.2)
EOSINOPHIL # BLD AUTO: 0.17 THOUSAND/ΜL (ref 0–0.61)
EOSINOPHIL NFR BLD AUTO: 3 % (ref 0–6)
ERYTHROCYTE [DISTWIDTH] IN BLOOD BY AUTOMATED COUNT: 12 % (ref 11.6–15.1)
GFR SERPL CREATININE-BSD FRML MDRD: 75 ML/MIN/1.73SQ M
GLUCOSE SERPL-MCNC: 89 MG/DL (ref 65–140)
GLUCOSE UR STRIP-MCNC: NEGATIVE MG/DL
HCT VFR BLD AUTO: 37.7 % (ref 36.5–49.3)
HGB BLD-MCNC: 13.3 G/DL (ref 12–17)
HGB UR QL STRIP.AUTO: ABNORMAL
IMM GRANULOCYTES # BLD AUTO: 0.01 THOUSAND/UL (ref 0–0.2)
IMM GRANULOCYTES NFR BLD AUTO: 0 % (ref 0–2)
INR PPP: 1.08 (ref 0.9–1.1)
KETONES UR STRIP-MCNC: NEGATIVE MG/DL
LEUKOCYTE ESTERASE UR QL STRIP: ABNORMAL
LYMPHOCYTES # BLD AUTO: 2.66 THOUSANDS/ΜL (ref 0.6–4.47)
LYMPHOCYTES NFR BLD AUTO: 50 % (ref 14–44)
MCH RBC QN AUTO: 32.3 PG (ref 26.8–34.3)
MCHC RBC AUTO-ENTMCNC: 35.3 G/DL (ref 31.4–37.4)
MCV RBC AUTO: 92 FL (ref 82–98)
MONOCYTES # BLD AUTO: 0.52 THOUSAND/ΜL (ref 0.17–1.22)
MONOCYTES NFR BLD AUTO: 10 % (ref 4–12)
MUCOUS THREADS UR QL AUTO: ABNORMAL
NEUTROPHILS # BLD AUTO: 1.98 THOUSANDS/ΜL (ref 1.85–7.62)
NEUTS SEG NFR BLD AUTO: 37 % (ref 43–75)
NITRITE UR QL STRIP: NEGATIVE
NON-SQ EPI CELLS URNS QL MICRO: ABNORMAL /HPF
PH UR STRIP.AUTO: 5.5 [PH]
PLATELET # BLD AUTO: 198 THOUSANDS/UL (ref 149–390)
PMV BLD AUTO: 10.4 FL (ref 8.9–12.7)
POTASSIUM SERPL-SCNC: 3.7 MMOL/L (ref 3.5–5)
PROT SERPL-MCNC: 6.9 G/DL (ref 6.4–8.3)
PROT UR STRIP-MCNC: ABNORMAL MG/DL
PROTHROMBIN TIME: 11.4 SECONDS (ref 9.5–12.1)
RBC # BLD AUTO: 4.12 MILLION/UL (ref 3.88–5.62)
RBC #/AREA URNS AUTO: ABNORMAL /HPF
SODIUM SERPL-SCNC: 137 MMOL/L (ref 133–145)
SP GR UR STRIP.AUTO: 1.02 (ref 1–1.03)
UROBILINOGEN UR QL STRIP.AUTO: 0.2 E.U./DL
WBC # BLD AUTO: 5.36 THOUSAND/UL (ref 4.31–10.16)
WBC #/AREA URNS AUTO: ABNORMAL /HPF

## 2020-08-30 PROCEDURE — 99284 EMERGENCY DEPT VISIT MOD MDM: CPT | Performed by: EMERGENCY MEDICINE

## 2020-08-30 PROCEDURE — 36415 COLL VENOUS BLD VENIPUNCTURE: CPT | Performed by: EMERGENCY MEDICINE

## 2020-08-30 PROCEDURE — 99284 EMERGENCY DEPT VISIT MOD MDM: CPT

## 2020-08-30 PROCEDURE — 85610 PROTHROMBIN TIME: CPT | Performed by: EMERGENCY MEDICINE

## 2020-08-30 PROCEDURE — 96361 HYDRATE IV INFUSION ADD-ON: CPT

## 2020-08-30 PROCEDURE — 85025 COMPLETE CBC W/AUTO DIFF WBC: CPT | Performed by: EMERGENCY MEDICINE

## 2020-08-30 PROCEDURE — 96374 THER/PROPH/DIAG INJ IV PUSH: CPT

## 2020-08-30 PROCEDURE — 80053 COMPREHEN METABOLIC PANEL: CPT | Performed by: EMERGENCY MEDICINE

## 2020-08-30 PROCEDURE — 81003 URINALYSIS AUTO W/O SCOPE: CPT | Performed by: EMERGENCY MEDICINE

## 2020-08-30 PROCEDURE — G1004 CDSM NDSC: HCPCS

## 2020-08-30 PROCEDURE — 81001 URINALYSIS AUTO W/SCOPE: CPT | Performed by: EMERGENCY MEDICINE

## 2020-08-30 PROCEDURE — 74177 CT ABD & PELVIS W/CONTRAST: CPT

## 2020-08-30 PROCEDURE — 85730 THROMBOPLASTIN TIME PARTIAL: CPT | Performed by: EMERGENCY MEDICINE

## 2020-08-30 RX ORDER — KETOROLAC TROMETHAMINE 30 MG/ML
30 INJECTION, SOLUTION INTRAMUSCULAR; INTRAVENOUS ONCE
Status: COMPLETED | OUTPATIENT
Start: 2020-08-30 | End: 2020-08-30

## 2020-08-30 RX ADMIN — KETOROLAC TROMETHAMINE 30 MG: 30 INJECTION, SOLUTION INTRAMUSCULAR at 10:34

## 2020-08-30 RX ADMIN — SODIUM CHLORIDE 1000 ML: 0.9 INJECTION, SOLUTION INTRAVENOUS at 10:34

## 2020-08-30 RX ADMIN — IOHEXOL 100 ML: 350 INJECTION, SOLUTION INTRAVENOUS at 11:03

## 2020-08-30 NOTE — ED NOTES
Bloody urine noted in urine, catheter irrigated with 500ml sterile water per provider  One blood clot removed about 1/2 in in size  Negative blood/ pink tinged urine after irrigation  Clear return  Provider aware  Pt re-educated on the importance of not tugging at mtz and the importance of not allowing the urine bag to fill  Pt verbalized understanding of mtz care        Maria Ines Mckeon RN  08/30/20 Nicolasa Ventura , RN  08/30/20 1755

## 2020-08-30 NOTE — ED NOTES
Stat lock securing device replaced on mtz and pt provided with new bag   Pt has emptied mtz bag twice in the ed with no leakage noted          Darwin Coyle RN  08/30/20 [id number]

## 2020-08-30 NOTE — ED PROVIDER NOTES
History  Chief Complaint   Patient presents with    Urinary Catheter Problem     pt presents to ed for catheter problem, states i was seen a few days ago but it still l;eaking, no other complaints at this time     This is a 30-year-old male who ambulates emergency department with problems with his Rodriguez catheter    This is the 3rd time I have seen this gentleman in the past week  At the beginning of the week patient came in with urinary retention  He does have a large prostate  A coude was placed and patient was sent home with a Rodriguez catheter and followed up with urology  He followed up with Urology 2 days later and they attempted to take the catheter out however patient was unable to void so was reinserted  He came by [facility] the next morning as the catheter was leaking  At that point we found that there is only 7 mL of water in the balloon so the balloon was inflated the rest of the way and there was no more leaking  Today patient comes in saying that he knows something is wrong  He states prior to having the catheter he knows that his pancreas liver and kidneys were all normal and feels that is something wrong now  He is complaining of lower abdominal pain  He also has pictures of his Rodriguez catheter and penis and blood on a 4 x 4  He states that in the morning he has to wipe blood off of the catheter  Denies fever or chills    On arrival, leg bag is full of andres colored urine - he is on Levaquin          Prior to Admission Medications   Prescriptions Last Dose Informant Patient Reported?  Taking?   finasteride (PROSCAR) 5 mg tablet  Self Yes No   Sig: Take 5 mg by mouth daily   levofloxacin (LEVAQUIN) 500 mg tablet  Self No No   Sig: Take 1 tablet (500 mg total) by mouth daily for 7 days   oxybutynin (DITROPAN) 5 mg tablet   No No   Sig: Take 1 tablet (5 mg total) by mouth 3 (three) times a day   tamsulosin (FLOMAX) 0 4 mg  Self Yes No   Sig: Take 0 4 mg by mouth daily with dinner Facility-Administered Medications: None       Past Medical History:   Diagnosis Date    History of lumbar surgery        History reviewed  No pertinent surgical history  History reviewed  No pertinent family history  I have reviewed and agree with the history as documented  E-Cigarette/Vaping    E-Cigarette Use Never User      E-Cigarette/Vaping Substances     Social History     Tobacco Use    Smoking status: Never Smoker    Smokeless tobacco: Never Used   Substance Use Topics    Alcohol use: Never     Frequency: Never    Drug use: Never       Review of Systems   Constitutional: Negative for activity change, appetite change, chills, diaphoresis, fatigue, fever and unexpected weight change  HENT: Negative for congestion, ear discharge, ear pain, mouth sores, sinus pressure, sinus pain, sneezing, sore throat, trouble swallowing and voice change  Eyes: Negative for photophobia, pain, discharge, redness, itching and visual disturbance  Respiratory: Negative for cough, chest tightness and shortness of breath  Cardiovascular: Negative for chest pain, palpitations and leg swelling  Gastrointestinal: Negative for abdominal pain, constipation, nausea and vomiting  Endocrine: Negative for cold intolerance, heat intolerance, polydipsia, polyphagia and polyuria  Genitourinary: Positive for hematuria  Negative for decreased urine volume, difficulty urinating, dysuria, frequency and urgency  Urinary retention and mtz cath disorder   Musculoskeletal: Negative for arthralgias, back pain, gait problem, joint swelling, myalgias, neck pain and neck stiffness  Skin: Negative for color change and rash  Allergic/Immunologic: Negative for immunocompromised state  Neurological: Negative for dizziness, tremors, seizures, syncope, speech difficulty, weakness, light-headedness, numbness and headaches  Hematological: Does not bruise/bleed easily     Psychiatric/Behavioral: Negative for behavioral problems and suicidal ideas  Physical Exam  Physical Exam  Vitals signs and nursing note reviewed  Constitutional:       General: He is not in acute distress  Appearance: Normal appearance  He is well-developed and normal weight  He is not ill-appearing, toxic-appearing or diaphoretic  HENT:      Head: Normocephalic and atraumatic  Right Ear: Tympanic membrane, ear canal and external ear normal  There is no impacted cerumen  Left Ear: Tympanic membrane, ear canal and external ear normal  There is no impacted cerumen  Nose: No congestion or rhinorrhea  Mouth/Throat:      Mouth: Mucous membranes are moist       Pharynx: Oropharynx is clear  No oropharyngeal exudate or posterior oropharyngeal erythema  Eyes:      General: No scleral icterus  Right eye: No discharge  Left eye: No discharge  Extraocular Movements: Extraocular movements intact  Conjunctiva/sclera: Conjunctivae normal       Pupils: Pupils are equal, round, and reactive to light  Neck:      Musculoskeletal: Normal range of motion and neck supple  No neck rigidity or muscular tenderness  Vascular: No JVD  Trachea: No tracheal deviation  Cardiovascular:      Rate and Rhythm: Normal rate and regular rhythm  Heart sounds: Normal heart sounds  No murmur  Pulmonary:      Effort: Pulmonary effort is normal  No respiratory distress  Breath sounds: Normal breath sounds  No wheezing or rales  Chest:      Chest wall: No tenderness  Abdominal:      General: Bowel sounds are normal       Palpations: Abdomen is soft  There is no mass  Tenderness: There is no abdominal tenderness  There is no right CVA tenderness, left CVA tenderness or guarding  Hernia: No hernia is present  Genitourinary:     Comments: Rodriguez cath in place = leg bag intact with andres colored urine - no visible blood - no leakage of urine around cath  Musculoskeletal: Normal range of motion  General: No swelling, tenderness, deformity or signs of injury  Right lower leg: No edema  Left lower leg: No edema  Lymphadenopathy:      Cervical: No cervical adenopathy  Skin:     General: Skin is warm and dry  Capillary Refill: Capillary refill takes less than 2 seconds  Findings: No bruising, erythema or rash  Neurological:      General: No focal deficit present  Mental Status: He is alert and oriented to person, place, and time  Mental status is at baseline  Cranial Nerves: No cranial nerve deficit  Sensory: No sensory deficit  Motor: No weakness or abnormal muscle tone  Coordination: Coordination normal       Gait: Gait normal       Deep Tendon Reflexes: Reflexes normal    Psychiatric:         Mood and Affect: Mood normal          Behavior: Behavior normal          Thought Content:  Thought content normal          Judgment: Judgment normal          Vital Signs  ED Triage Vitals [08/30/20 1004]   Temperature Pulse Respirations Blood Pressure SpO2   98 6 °F (37 °C) 78 18 134/77 98 %      Temp Source Heart Rate Source Patient Position - Orthostatic VS BP Location FiO2 (%)   Tympanic Monitor -- Right arm --      Pain Score       5           Vitals:    08/30/20 1004 08/30/20 1100 08/30/20 1201   BP: 134/77 132/71 137/75   Pulse: 78 80 84         Visual Acuity      ED Medications  Medications   ketorolac (TORADOL) injection 30 mg (30 mg Intravenous Given 8/30/20 1034)   sodium chloride 0 9 % bolus 1,000 mL (0 mL Intravenous Stopped 8/30/20 1134)   iohexol (OMNIPAQUE) 350 MG/ML injection (MULTI-DOSE) 100 mL (100 mL Intravenous Given 8/30/20 1103)       Diagnostic Studies  Results Reviewed     Procedure Component Value Units Date/Time    Urine Microscopic [[id number]]  (Abnormal) Collected:  08/30/20 1032    Lab Status:  Final result Specimen:  Urine, Suprapubic catheter Updated:  08/30/20 1114     RBC, UA Innumerable /hpf      WBC, UA 0-5 /hpf      Epithelial Cells Occasional /hpf      Bacteria, UA None Seen /hpf      MUCUS THREADS Occasional    Comprehensive metabolic panel [[id number]]  (Abnormal) Collected:  08/30/20 1031    Lab Status:  Final result Specimen:  Blood from Arm, Right Updated:  08/30/20 1055     Sodium 137 mmol/L      Potassium 3 7 mmol/L      Chloride 102 mmol/L      CO2 27 mmol/L      ANION GAP 8 mmol/L      BUN 14 mg/dL      Creatinine 1 19 mg/dL      Glucose 89 mg/dL      Calcium 9 4 mg/dL      AST 36 U/L      ALT 17 U/L      Alkaline Phosphatase 57 0 U/L      Total Protein 6 9 g/dL      Albumin 4 1 g/dL      Total Bilirubin 1 78 mg/dL      eGFR 75 ml/min/1 73sq m     Narrative:       [place] guidelines for Chronic Kidney Disease (CKD):     Stage 1 with normal or high GFR (GFR > 90 mL/min/1 73 square meters)    Stage 2 Mild CKD (GFR = 60-89 mL/min/1 73 square meters)    Stage 3A Moderate CKD (GFR = 45-59 mL/min/1 73 square meters)    Stage 3B Moderate CKD (GFR = 30-44 mL/min/1 73 square meters)    Stage 4 Severe CKD (GFR = 15-29 mL/min/1 73 square meters)    Stage 5 End Stage CKD (GFR <15 mL/min/1 73 square meters)  Note: GFR calculation is accurate only with a steady state creatinine    Protime-INR [[id number]]  (Normal) Collected:  08/30/20 1031    Lab Status:  Final result Specimen:  Blood from Arm, Right Updated:  08/30/20 1053     Protime 11 4 seconds      INR 1 08    Narrative:       INR Reference Ranges:  No Anticoagulant, Normal:           0 9-1 1  Standard Dose, Oral Anticoagulant:  2 0-3 0  High Dose, Oral Anticoagulant:      2 5-3 5    APTT [[id number]]  (Normal) Collected:  08/30/20 1031    Lab Status:  Final result Specimen:  Blood from Arm, Right Updated:  08/30/20 1053     PTT 31 seconds     UA w Reflex to Microscopic w Reflex to Culture [[id number]]  (Abnormal) Collected:  08/30/20 1032    Lab Status:  Final result Specimen:  Urine, Suprapubic catheter Updated:  08/30/20 1048     Color, UA Yellow     Clarity, UA Clear     Specific Gravity, UA 1 025     pH, UA 5 5     Leukocytes, UA 1+     Nitrite, UA Negative     Protein, UA 2+ mg/dl      Glucose, UA Negative mg/dl      Ketones, UA Negative mg/dl      Urobilinogen, UA 0 2 E U /dl      Bilirubin, UA Negative     Blood, UA 3+    CBC and differential [[id number]]  (Abnormal) Collected:  08/30/20 1031    Lab Status:  Final result Specimen:  Blood from Arm, Right Updated:  08/30/20 1039     WBC 5 36 Thousand/uL      RBC 4 12 Million/uL      Hemoglobin 13 3 g/dL      Hematocrit 37 7 %      MCV 92 fL      MCH 32 3 pg      MCHC 35 3 g/dL      RDW 12 0 %      MPV 10 4 fL      Platelets 615 Thousands/uL      Neutrophils Relative 37 %      Immat GRANS % 0 %      Lymphocytes Relative 50 %      Monocytes Relative 10 %      Eosinophils Relative 3 %      Basophils Relative 0 %      Neutrophils Absolute 1 98 Thousands/µL      Immature Grans Absolute 0 01 Thousand/uL      Lymphocytes Absolute 2 66 Thousands/µL      Monocytes Absolute 0 52 Thousand/µL      Eosinophils Absolute 0 17 Thousand/µL      Basophils Absolute 0 02 Thousands/µL                  CT abdomen pelvis with contrast   Final Result by Maryanne Lynch MD (08/30 1120)      1  Unremarkable kidneys  No CT findings for pyelonephritis  Mtz catheter in position  Questionable diffuse bladder wall thickening though evaluation is limited due to bladder underdistention  2   Marked prostatomegaly  Correlate with PSA levels and urologic evaluation as warranted              Workstation performed: [id number]                    Procedures  Procedures         ED Course  ED Course as of Aug 30 1406   Sun Aug 30, 2020   1351 This is a 60year old male that has had a mtz catheter for the  past week due to urinary retention secondary to an enlarged prostate - he was seen by urology earlier in the week and failed an attempt to remove the cath    He arrives today - he is becoming fixated on the mtz - he has pictures of the cath and his penis on his cell phone that he insists on showing providers insisting there is something wrong  No acute findings on CT except for thickened bladder wall and enlarged prostate    UA shows UTI - will continue Levaquin  No leukocytosis - 5 36  Hemoglobin 13 3    Discharged to follow up with urology - RN spent a lot of time with pt reinstructing him on proper care of the catheter          US AUDIT      Most Recent Value   Initial Alcohol Screen: US AUDIT-C    1  How often do you have a drink containing alcohol?  0 Filed at: 08/30/2020 1004   2  How many drinks containing alcohol do you have on a typical day you are drinking? 0 Filed at: 08/30/2020 1004   3a  Male UNDER 65: How often do you have five or more drinks on one occasion? 0 Filed at: 08/30/2020 1004   3b  FEMALE Any Age, or MALE 65+: How often do you have 4 or more drinks on one occassion? 0 Filed at: 08/30/2020 1004   Audit-C Score  0 Filed at: 08/30/2020 1004                  MILTON/DAST-10      Most Recent Value   How many times in the past year have you    Used an illegal drug or used a prescription medication for non-medical reasons?   Never Filed at: 08/30/2020 1004                                MDM  Number of Diagnoses or Management Options  Problem with urinary catheter ([facility] [address] ): established and worsening  UTI (urinary tract infection): established and worsening  Diagnosis management comments: UTI, urinary retention, pyelo, mtz dysfunction       Amount and/or Complexity of Data Reviewed  Clinical lab tests: ordered and reviewed  Tests in the radiology section of CPT®: ordered and reviewed  Review and summarize past medical records: yes  Independent visualization of images, tracings, or specimens: yes    Risk of Complications, Morbidity, and/or Mortality  Presenting problems: moderate  Diagnostic procedures: low  Management options: low    Patient Progress  Patient progress: stable        Disposition  Final diagnoses:   UTI (urinary tract infection) Problem with urinary catheter [facility])     Time reflects when diagnosis was documented in both MDM as applicable and the Disposition within this note     Time User Action Codes Description Comment    8/30/2020 12:11 PM Gayatri Dilling Add [N39 0] UTI (urinary tract infection)     8/30/2020 12:11 PM Gayatri Dilling Add [T83  9XXA] Problem with urinary catheter [facility])       ED Disposition     ED Disposition Condition Date/Time Comment    Discharge Stable Sun Aug 30, 2020 12:10 PM Jerone Pallas discharge to home/self care  Follow-up Information     Follow up With Specialties Details Why Contact Info    Cortes Cool MD Family Medicine In 1 day  [address]   [phone]            Discharge Medication List as of 8/30/2020 12:12 PM      CONTINUE these medications which have NOT CHANGED    Details   finasteride (PROSCAR) 5 mg tablet Take 5 mg by mouth daily, Historical Med      levofloxacin (LEVAQUIN) 500 mg tablet Take 1 tablet (500 mg total) by mouth daily for 7 days, Starting Wed 8/26/2020, Until Wed 9/2/2020, Normal      oxybutynin (DITROPAN) 5 mg tablet Take 1 tablet (5 mg total) by mouth 3 (three) times a day, Starting Fri 8/28/2020, Normal      tamsulosin (FLOMAX) 0 4 mg Take 0 4 mg by mouth daily with dinner, Historical Med           No discharge procedures on file      PDMP Review     None          ED Provider  Electronically Signed by           Karma Estrada MD  08/30/20 5987

## 2020-09-04 ENCOUNTER — HOSPITAL ENCOUNTER (EMERGENCY)
Facility: HOSPITAL | Age: 65
Discharge: HOME | End: 2020-09-04
Attending: EMERGENCY MEDICINE | Admitting: EMERGENCY MEDICINE
Payer: COMMERCIAL

## 2020-09-04 VITALS
DIASTOLIC BLOOD PRESSURE: 71 MMHG | OXYGEN SATURATION: 100 % | RESPIRATION RATE: 18 BRPM | SYSTOLIC BLOOD PRESSURE: 140 MMHG | HEART RATE: 80 BPM

## 2020-09-04 DIAGNOSIS — T83.091A OBSTRUCTION OF FOLEY CATHETER, INITIAL ENCOUNTER (HCC): Primary | ICD-10-CM

## 2020-09-04 PROCEDURE — 99284 EMERGENCY DEPT VISIT MOD MDM: CPT | Performed by: EMERGENCY MEDICINE

## 2020-09-04 PROCEDURE — 99283 EMERGENCY DEPT VISIT LOW MDM: CPT

## 2020-09-04 RX ORDER — BACITRACIN, NEOMYCIN, POLYMYXIN B 400; 3.5; 5 [USP'U]/G; MG/G; [USP'U]/G
1 OINTMENT TOPICAL ONCE
Status: COMPLETED | OUTPATIENT
Start: 2020-09-04 | End: 2020-09-04

## 2020-09-04 RX ORDER — LIDOCAINE HYDROCHLORIDE 20 MG/ML
1 JELLY TOPICAL ONCE
Status: COMPLETED | OUTPATIENT
Start: 2020-09-04 | End: 2020-09-04

## 2020-09-04 RX ADMIN — BACITRACIN, NEOMYCIN, POLYMYXIN B 1 SMALL APPLICATION: 400; 3.5; 5 OINTMENT TOPICAL at 11:20

## 2020-09-04 RX ADMIN — LIDOCAINE HYDROCHLORIDE 1 APPLICATION: 20 JELLY TOPICAL at 10:05

## 2020-09-04 NOTE — ED NOTES
D/c reviewed with pt  Pt verbalized understanding and has no further questions at this time        Rhianna Mckinney RN  09/04/20 4379

## 2020-09-04 NOTE — ED PROVIDER NOTES
History  Chief Complaint   Patient presents with    Urinary Catheter Problem     pt reports leaking around catheter that was inserted last week     This 60 year male with a history of urinary retention and enlarged prostate, with indwelling Rodriguez catheter, who presents for evaluation of this functioning catheter  He states that he has had urine leaking around catheter and only some of it seems to be going in the back itself  He is postop follow-up with urology in 4 days to have it evaluated possible removal he states, as he has been taking a new prostate medicine  He denies any fevers, chills, vomiting, diarrhea, abdominal pain  History provided by:  Patient   used: No    Urinary Catheter Problem   Location:  Penis  Severity:  Mild  Onset quality:  Gradual  Timing:  Intermittent  Progression:  Waxing and waning  Chronicity:  New      Prior to Admission Medications   Prescriptions Last Dose Informant Patient Reported? Taking?   finasteride (PROSCAR) 5 mg tablet  Self Yes No   Sig: Take 5 mg by mouth daily   oxybutynin (DITROPAN) 5 mg tablet   No No   Sig: Take 1 tablet (5 mg total) by mouth 3 (three) times a day   tamsulosin (FLOMAX) 0 4 mg  Self Yes No   Sig: Take 0 4 mg by mouth daily with dinner      Facility-Administered Medications: None       Past Medical History:   Diagnosis Date    History of lumbar surgery     Prostate hypertrophy        History reviewed  No pertinent surgical history  History reviewed  No pertinent family history  I have reviewed and agree with the history as documented  E-Cigarette/Vaping    E-Cigarette Use Never User      E-Cigarette/Vaping Substances     Social History     Tobacco Use    Smoking status: Never Smoker    Smokeless tobacco: Never Used   Substance Use Topics    Alcohol use: Never     Frequency: Never    Drug use: Never       Review of Systems   Genitourinary: Positive for difficulty urinating     All other systems reviewed and are negative  Physical Exam  Physical Exam  Vitals signs and nursing note reviewed  Constitutional:       Appearance: Normal appearance  He is normal weight  HENT:      Head: Normocephalic and atraumatic  Mouth/Throat:      Mouth: Mucous membranes are moist       Pharynx: Oropharynx is clear  Eyes:      Conjunctiva/sclera: Conjunctivae normal    Cardiovascular:      Rate and Rhythm: Normal rate and regular rhythm  Pulses: Normal pulses  Heart sounds: Normal heart sounds  Pulmonary:      Effort: Pulmonary effort is normal    Abdominal:      General: Abdomen is flat  Palpations: Abdomen is soft  Genitourinary:     Comments: Catheter in place, small leaking urine around bag, brownish urine in bag, no suprapubic tenderness  Musculoskeletal: Normal range of motion  Skin:     General: Skin is warm and dry  Capillary Refill: Capillary refill takes less than 2 seconds  Neurological:      General: No focal deficit present  Mental Status: He is alert and oriented to person, place, and time  Mental status is at baseline     Psychiatric:         Mood and Affect: Mood normal          Behavior: Behavior normal          Vital Signs  ED Triage Vitals [09/04/20 0855]   Temp Pulse Respirations Blood Pressure SpO2   -- 80 18 140/71 100 %      Temp src Heart Rate Source Patient Position - Orthostatic VS BP Location FiO2 (%)   -- Monitor -- -- --      Pain Score       3           Vitals:    09/04/20 0855   BP: 140/71   Pulse: 80         Visual Acuity      ED Medications  Medications   lidocaine (URO-JET) 2 % urethral/mucosal gel 1 application (1 application Urethral Given 9/4/20 1005)   neomycin-bacitracin-polymyxin b (NEOSPORIN) ointment 1 small application (1 small application Topical Given 9/4/20 1120)       Diagnostic Studies  Results Reviewed     None                 No orders to display              Procedures  Procedures         ED Course  ED Course as of Sep 04 1235   Fri Sep 04, 2020 1024 RN replaced catheter- prior 16 fr catheter was occluded with a blood clot  18 fr catheter placed, and bladder irrigated with saline by hand  US AUDIT      Most Recent Value   Initial Alcohol Screen: US AUDIT-C    1  How often do you have a drink containing alcohol?  0 Filed at: 09/04/2020 0853   2  How many drinks containing alcohol do you have on a typical day you are drinking? 0 Filed at: 09/04/2020 0853   3a  Male UNDER 65: How often do you have five or more drinks on one occasion? 0 Filed at: 09/04/2020 0853   Audit-C Score  0 Filed at: 09/04/2020 2918                  MILTON/DAST-10      Most Recent Value   How many times in the past year have you    Used an illegal drug or used a prescription medication for non-medical reasons? Never Filed at: 09/04/2020 0900                                MDM  Number of Diagnoses or Management Options  Obstruction of Mtz catheter, initial encounter [facility]): new and does not require workup  Diagnosis management comments: Mtz catheter in place, replaced by RN - bladder irrigated by RN, tolerated well, improved urine flow  Seems to be mtz cath was obstructed w/ blood clot  F/u urology as scheduled in 4 days         Amount and/or Complexity of Data Reviewed  Decide to obtain previous medical records or to obtain history from someone other than the patient: yes  Independent visualization of images, tracings, or specimens: yes    Risk of Complications, Morbidity, and/or Mortality  Presenting problems: low  Diagnostic procedures: low  Management options: low    Patient Progress  Patient progress: stable        Disposition  Final diagnoses:   Obstruction of Mtz catheter, initial encounter [facility])     Time reflects when diagnosis was documented in both MDM as applicable and the Disposition within this note     Time User Action Codes Description Comment    9/4/2020 11:06 AM Modesta Sapp Add [T83 091A] [facility] catheter[facility] Disposition     ED Disposition Condition Date/Time Comment    Discharge Stable Fri Sep 4, 2020 11:06 AM Mi Cochran discharge to home/self care  Follow-up Information     Follow up With Specialties Details Why Contact Info    Ashu Lima MD Family Medicine Call in 2 days  [address] [facility]  [phone]      your urologist              Discharge Medication List as of 9/4/2020 11:08 AM      CONTINUE these medications which have NOT CHANGED    Details   finasteride (PROSCAR) 5 mg tablet Take 5 mg by mouth daily, Historical Med      oxybutynin (DITROPAN) 5 mg tablet Take 1 tablet (5 mg total) by mouth 3 (three) times a day, Starting Fri 8/28/2020, Normal      tamsulosin (FLOMAX) 0 4 mg Take 0 4 mg by mouth daily with dinner, Historical Med           No discharge procedures on file      PDMP Review     None          ED Provider  Electronically Signed by           Rosalba Case DO  09/04/20 1317

## 2020-09-08 ENCOUNTER — PROCEDURE VISIT (OUTPATIENT)
Dept: UROLOGY | Facility: CLINIC | Age: 65
End: 2020-09-08
Payer: COMMERCIAL

## 2020-09-08 VITALS
DIASTOLIC BLOOD PRESSURE: 90 MMHG | TEMPERATURE: 97.9 F | HEIGHT: 69 IN | BODY MASS INDEX: 23.63 KG/M2 | SYSTOLIC BLOOD PRESSURE: 148 MMHG | RESPIRATION RATE: 18 BRPM

## 2020-09-08 DIAGNOSIS — R33.9 URINARY RETENTION: Primary | ICD-10-CM

## 2020-09-08 LAB — POST-VOID RESIDUAL VOLUME, ML POC: 142 ML

## 2020-09-08 PROCEDURE — 51798 US URINE CAPACITY MEASURE: CPT

## 2020-09-08 NOTE — PROGRESS NOTES
9/8/2020    Ketty Hillman  1955  [id number]    Diagnosis  Chief Complaint     Urinary Retention          Patient presents for second mtz removal and void trial s/p acute retention on 8/26/20 managed by [facility] for Urology  Plan  Patient will follow up as scheduled for cysto/TRUS next month  He knows to call in the meantime with any questions/concerns  Procedure Mtz removal/voiding trial    Mtz catheter removed after deflation of an intact balloon  Patient tolerated well  Encouraged patient to hydrate well and return this afternoon for post void residual   he knows he may return early if uncomfortable and unable to urinate  Patient agrees to this plan  Patient returned this afternoon  Patient states able to void  Patient reports he has been voiding well today, three times for good amounts  He also had some urge incontinence  Advised this is typical a few days after mtz catheter removal and should subside  Bladder ultrasound performed and PVR measured 142ml  Advised patient should keep procedure appointment as scheduled for cysto/TRUS as this will determine why he was having retention  Reviewed timed voiding in the meantime to help keep bladder empty  Patient verbalized understanding       Recent Results (from the past 4 hour(s))   POCT Measure PVR    Collection Time: 09/08/20  2:35 PM   Result Value Ref Range    POST-VOID RESIDUAL VOLUME, ML  mL           Vitals:    09/08/20 0844   BP: 148/90   BP Location: Right arm   Patient Position: Sitting   Cuff Size: Standard   Resp: 18   Temp: 97 9 °F (36 6 °C)   Height: 5' 9" (1 753 m)           Beth Nance RN BSN

## 2020-09-14 NOTE — PROGRESS NOTES
PT Discharge    Today's date: 2020  Patient name: Mi Cochran  : 1955  MRN: [id number]  Referring provider: Olga Russell MD  Dx:   Encounter Diagnosis     ICD-10-CM    1  Chronic bilateral low back pain with bilateral sciatica  M54 42     M54 41     G89 29    2  DDD (degenerative disc disease), lumbar  M51 36        Start Time: 1400  Stop Time: 4605  Total time in clinic (min): 55 minutes    Assessment/Plan  Mi Cochran has not returned since last appointment, unable to perform objective measures since then  It is assumed they have returned to prior level of function and do not desire additional physical therapy  At this time, Mi Cochran will be discharged from physical therapy services      Subjective    Objective

## 2020-09-30 NOTE — ED NOTES
Detail Level: Simple Provider at bedside       Darwin Coyle RN  08/30/20 1013 Detail Level: Detailed Body Location Override (Optional - Billing Will Still Be Based On Selected Body Map Location If Applicable): central back Accession # (Optional): [id number] Number Of Curettages: 3 Size Of Lesion In Cm: 1 Size Of Lesion After Curettage: 1.4 Add Intralesional Injection: No Concentration (Mg/Ml Or Millions Of Plaque Forming Units/Cc): 0.01 Anesthesia Type: 1% lidocaine with epinephrine Cautery Type: electrodesiccation What Was Performed First?: Curettage Additional Information: (Optional): The wound was cleaned, and a pressure dressing was applied. The patient received detailed post-op instructions. Consent was obtained from the patient. The risks, benefits and alternatives to therapy were discussed in detail. Specifically, the risks of infection, scarring, bleeding, prolonged wound healing, nerve injury, incomplete removal, allergy to anesthesia and recurrence were addressed. Alternatives to [facility], such as: surgical removal and XRT were also discussed. Prior to the procedure, the treatment site was clearly identified and confirmed by the patient. All components of Universal Protocol/PAUSE Rule completed. Post-Care Instructions: I reviewed with the patient in detail post-care instructions. Patient is to keep the area dry for 48 hours, and not to engage in any swimming until the area is healed. Should the patient develop any fevers, chills, bleeding, severe pain patient will contact the office immediately. Bill As A Line Item Or As Units: Line Item

## 2020-10-19 ENCOUNTER — TELEPHONE (OUTPATIENT)
Dept: UROLOGY | Facility: CLINIC | Age: 65
End: 2020-10-19